# Patient Record
Sex: FEMALE | Race: OTHER | Employment: UNEMPLOYED | URBAN - METROPOLITAN AREA
[De-identification: names, ages, dates, MRNs, and addresses within clinical notes are randomized per-mention and may not be internally consistent; named-entity substitution may affect disease eponyms.]

---

## 2022-02-20 ENCOUNTER — HOSPITAL ENCOUNTER (EMERGENCY)
Age: 22
Discharge: HOME | End: 2022-02-20
Attending: EMERGENCY MEDICINE
Payer: MEDICAID

## 2022-02-20 VITALS
RESPIRATION RATE: 20 BRPM | WEIGHT: 182 LBS | HEIGHT: 66 IN | DIASTOLIC BLOOD PRESSURE: 80 MMHG | SYSTOLIC BLOOD PRESSURE: 123 MMHG | OXYGEN SATURATION: 96 % | BODY MASS INDEX: 29.25 KG/M2 | TEMPERATURE: 98.7 F | HEART RATE: 130 BPM

## 2022-02-20 DIAGNOSIS — R10.9 ABDOMINAL PAIN IN PREGNANCY, THIRD TRIMESTER: Primary | ICD-10-CM

## 2022-02-20 DIAGNOSIS — O26.893 ABDOMINAL PAIN IN PREGNANCY, THIRD TRIMESTER: Primary | ICD-10-CM

## 2022-02-20 PROCEDURE — 99283 EMERGENCY DEPT VISIT LOW MDM: CPT

## 2022-02-20 NOTE — ED TRIAGE NOTES
Patient here with 8 months pregnant, due 04/12/22. Had nausea severely today. She is visiting here from [place] and she called her OB and they wanted her to come to ED to make sure we can \" hear the baby's heartbeat\". Patient states that she has been feeling the baby move normally. Patient is high-risk pregnancy and has a cerclage. Patient's HR is 130, which she states is normal for her.

## 2022-02-20 NOTE — ED NOTES
Pt sent by her midwife. 8 months pregnant, nausea this morning states she was unable to keep anything down. States she got in a hot shower and nausea resolved but her midwife wanted her to come get FHT reassessed. Pt is alert and oriented x 4, RR even and unlabored, skin is warm and dry. Assessment completed and pt updated on plan of care. Call bell in reach. Emergency Department Nursing Plan of Care       The Nursing Plan of Care is developed from the Nursing assessment and Emergency Department Attending provider initial evaluation. The plan of care may be reviewed in the ED Provider note.     The Plan of Care was developed with the following considerations:   Patient / Family readiness to learn indicated by:verbalized understanding  Persons(s) to be included in education: patient  Barriers to Learning/Limitations:No    Signed     Radhames Sabillon RN    2/20/2022   11:45 AM

## 2022-02-20 NOTE — ED PROVIDER NOTES
EMERGENCY DEPARTMENT HISTORY AND PHYSICAL EXAM      Date: 2022  Patient Name: Rae George    History of Presenting Illness     Chief Complaint   Patient presents with    Pregnancy Problem     History Provided By: Patient    HPI: Rae George, 19 y.o. female with no past medical history who presents via private vehicle to the ED with cc of nausea and cramping abdominal pain that started this morning around 5:00. Patient states that the pain was generalized in nature and did not radiate. She states it felt like she had either vomit or have a bowel movement but initially was able to do either of them. She is 32 weeks pregnant with her second pregnancy. She had a miscarriage on . She is followed by OB/GYN in [facility]. She called the OB/GYN today and was told to come to the emergency department for evaluation and for a fetal heart rate. She denies any vaginal bleeding, vaginal discharge, dysuria, urinary frequency, or gush of clear fluids. She had a bowel movement approximately an hour and a half ago and states her symptoms have since resolved after the bowel movement. She has a follow-up appointment with her OB/GYN this Thursday. She is a A1. PMHx: None  Social Hx: Denies alcohol, tobacco, or illegal drug    PCP: Amanda Douglas MD    There are no other complaints, changes, or physical findings at this time. No current facility-administered medications on file prior to encounter. No current outpatient medications on file prior to encounter. Past History     Past Medical History:  No past medical history on file. Past Surgical History:  No past surgical history on file. Family History:  No family history on file.   Social History:  Social History     Tobacco Use    Smoking status: Not on file    Smokeless tobacco: Not on file   Substance Use Topics    Alcohol use: Not on file    Drug use: Not on file     Allergies:  No Known Allergies  Review of Systems Review of Systems   Constitutional: Negative for chills and fever. HENT: Negative for congestion, rhinorrhea, sneezing and sore throat. Eyes: Negative for redness and visual disturbance. Respiratory: Negative for shortness of breath. Cardiovascular: Negative for leg swelling. Gastrointestinal: Positive for abdominal pain and nausea. Negative for vomiting. Genitourinary: Negative for difficulty urinating, dysuria, frequency, pelvic pain, vaginal bleeding and vaginal discharge. Musculoskeletal: Negative for back pain, myalgias and neck stiffness. Skin: Negative for rash. Neurological: Negative for dizziness, syncope, weakness and headaches. Hematological: Negative for adenopathy. All other systems reviewed and are negative. Physical Exam   Physical Exam  Vitals and nursing note reviewed. Constitutional:       Appearance: Normal appearance. She is well-developed. HENT:      Head: Normocephalic and atraumatic. Eyes:      Conjunctiva/sclera: Conjunctivae normal.   Cardiovascular:      Rate and Rhythm: Regular rhythm. Tachycardia present. Pulses: Normal pulses. Heart sounds: Normal heart sounds, S1 normal and S2 normal.   Pulmonary:      Effort: Pulmonary effort is normal. No respiratory distress. Breath sounds: Normal breath sounds. No wheezing. Abdominal:      General: Bowel sounds are normal. There is no distension. Palpations: Abdomen is soft. Tenderness: There is no abdominal tenderness. There is no rebound. Comments: Gravid uterus palpated well above the umbilicus   Musculoskeletal:         General: Normal range of motion. Cervical back: Full passive range of motion without pain, normal range of motion and neck supple. Skin:     General: Skin is warm and dry. Findings: No rash. Neurological:      Mental Status: She is alert and oriented to person, place, and time.    Psychiatric:         Speech: Speech normal.         Behavior: Behavior normal.         Thought Content: Thought content normal.         Judgment: Judgment normal.       Diagnostic Study Results   Labs -   No results found for this or any previous visit (from the past 12 hour(s)). Radiologic Studies -   No orders to display     No results found. Medical Decision Making   I am the first provider for this patient. I reviewed the vital signs, available nursing notes, past medical history, past surgical history, family history and social history. Vital Signs-Reviewed the patient's vital signs. Patient Vitals for the past 24 hrs:   Temp Pulse Resp BP SpO2   02/20/22 1637 -- -- -- -- 96 %   02/20/22 1619 98.7 °F (37.1 °C) (!) 130 20 123/80 96 %     Pulse Oximetry Analysis - 96% on RA (borderline)    Records Reviewed: Nursing Notes    Provider Notes (Medical Decision Making):   64-year-old female presents with nausea and cramping abdominal pain that started early this morning and eventually resolved after a bowel movement. She has felt the baby kick and now denies any symptoms. Will check a fetal heart rate and reassess. If the fetal heart rate is abnormal, will consider ultrasound and UA. ED Course:   Initial assessment performed. The patients presenting problems have been discussed, and they are in agreement with the care plan formulated and outlined with them. I have encouraged them to ask questions as they arise throughout their visit. Fetal heart rate was 135 beats per minutes. Patient states she does not require any other work-up and feels comfortable being discharged at this time. She agrees to return if her symptoms return home or she has any other concerning symptoms including vaginal bleeding, cramping pain that does not resolve, or gush of clear fluids. Progress Note:   Updated pt on all returned results and findings. Discussed the importance of proper follow up as referred below along with return precautions.  Pt in agreement with the care plan and expresses agreement with and understanding of all items discussed. Disposition:  Discharge Note:  The pt is ready for discharge. The pt's signs, symptoms, diagnosis, and discharge instructions have been discussed and pt has conveyed their understanding. The pt is to follow up as recommended or return to ER should their symptoms worsen. Plan has been discussed and pt is in agreement. PLAN:  1. There are no discharge medications for this patient. 2.   Follow-up Information     Follow up With Specialties Details Why Contact Info    Your OB/GYN in [address] [place] [address]  On 2/24/2022      [facility] EMERGENCY DEPT Emergency Medicine  As needed, If symptoms worsen [facility]  [phone]        Return to ED if worse     Diagnosis     Clinical Impression:   1. Abdominal pain in pregnancy, third trimester            Please note that this dictation was completed with Dragon, computer voice recognition software. Quite often unanticipated grammatical, syntax, homophones, and other interpretive errors are inadvertently transcribed by the computer software. Please disregard these errors. Additionally, please excuse any errors that have escaped final proofreading.